# Patient Record
Sex: MALE | Race: WHITE | NOT HISPANIC OR LATINO | Employment: OTHER | URBAN - METROPOLITAN AREA
[De-identification: names, ages, dates, MRNs, and addresses within clinical notes are randomized per-mention and may not be internally consistent; named-entity substitution may affect disease eponyms.]

---

## 2021-06-03 RX ORDER — SIMVASTATIN 40 MG
40 TABLET ORAL NIGHTLY
COMMUNITY
End: 2022-02-17

## 2021-06-03 RX ORDER — ASPIRIN 81 MG/1
81 TABLET ORAL DAILY
COMMUNITY
End: 2021-06-07

## 2021-06-03 RX ORDER — METFORMIN HYDROCHLORIDE 500 MG/1
500 TABLET, EXTENDED RELEASE ORAL
COMMUNITY
End: 2021-06-07

## 2021-06-03 RX ORDER — MULTIPLE VITAMINS W/ MINERALS TAB 9MG-400MCG
1 TAB ORAL DAILY
COMMUNITY

## 2021-06-03 RX ORDER — ALFUZOSIN HYDROCHLORIDE 10 MG/1
10 TABLET, EXTENDED RELEASE ORAL DAILY
COMMUNITY

## 2021-06-03 RX ORDER — LEVOTHYROXINE SODIUM 0.1 MG/1
100 TABLET ORAL TAKE AS DIRECTED
COMMUNITY

## 2021-06-03 RX ORDER — RANITIDINE HCL 75 MG
75 TABLET ORAL 2 TIMES DAILY
COMMUNITY
End: 2021-06-07

## 2021-06-07 ENCOUNTER — OFFICE VISIT (OUTPATIENT)
Dept: CARDIOLOGY | Facility: CLINIC | Age: 71
End: 2021-06-07

## 2021-06-07 VITALS
OXYGEN SATURATION: 97 % | BODY MASS INDEX: 23.74 KG/M2 | SYSTOLIC BLOOD PRESSURE: 120 MMHG | WEIGHT: 185 LBS | HEART RATE: 106 BPM | HEIGHT: 74 IN | DIASTOLIC BLOOD PRESSURE: 78 MMHG | RESPIRATION RATE: 18 BRPM

## 2021-06-07 DIAGNOSIS — I49.3 PVC'S (PREMATURE VENTRICULAR CONTRACTIONS): ICD-10-CM

## 2021-06-07 DIAGNOSIS — R06.09 DYSPNEA ON EXERTION: Primary | ICD-10-CM

## 2021-06-07 DIAGNOSIS — I34.0 NONRHEUMATIC MITRAL VALVE REGURGITATION: ICD-10-CM

## 2021-06-07 DIAGNOSIS — I10 ESSENTIAL HYPERTENSION: ICD-10-CM

## 2021-06-07 DIAGNOSIS — I49.3 PVC (PREMATURE VENTRICULAR CONTRACTION): ICD-10-CM

## 2021-06-07 PROCEDURE — 93000 ELECTROCARDIOGRAM COMPLETE: CPT | Performed by: INTERNAL MEDICINE

## 2021-06-07 PROCEDURE — 99203 OFFICE O/P NEW LOW 30 MIN: CPT | Performed by: INTERNAL MEDICINE

## 2021-06-07 NOTE — PROGRESS NOTES
Subjective:     Encounter Date:06/07/2021      Patient ID: Kendell Valdez is a 71 y.o. male.    Chief Complaint:  Chief Complaint   Patient presents with   • Establish Care     PVC's       HPI:  Patient presents to re-establish care for his PVC's.  He was previously followed at  but hasn't been seen in over 3 years.  His past medical history is significant for HTN, HLD, DM and hypothyroidism.  He has had PVC's for years and had a holter monitor done in 2017 which showed a 1.7% burden.  A stress echo 12/17 showed normal EF, mild-moderate MR and no ischemia.  He was not treated with B blockers due to low burden.    Since he was last seen, he has done well without any symptoms of chest discomfort, dyspnea, dizziness or fatigue.  He does note an occasional skipped beat but they are less frequent than in previous years.  He has retired from [employer] and now walks 8-10 miles per day.  He has altered his diet and lost weight for his diabetes and his A1C is around 5.5.      The following portions of the patient's history were reviewed and updated as appropriate: allergies, current medications, past family history, past medical history, past social history, past surgical history and problem list.    Problem List:  Patient Active Problem List   Diagnosis   • PVC (premature ventricular contraction)   • Mitral valve regurgitation   • Hyperlipidemia   • Hypertension   • GERD (gastroesophageal reflux disease)   • Diabetes mellitus (CMS/HCC)       Past Medical History:  Past Medical History:   Diagnosis Date   • Diabetes mellitus (CMS/HCC)    • GERD (gastroesophageal reflux disease)    • History of stress test 12/12/2017    Treadmill Stress Echo was negative for ischemia, EF 75%, Mild-Moderate MR   • Hyperlipidemia    • Hypertension    • Mitral valve regurgitation    • PVC (premature ventricular contraction)        Past Surgical History:  Past Surgical History:   Procedure Laterality Date   • NO PAST SURGERIES    "      Social History:  Social History     Socioeconomic History   • Marital status:      Spouse name: Not on file   • Number of children: Not on file   • Years of education: Not on file   • Highest education level: Not on file   Tobacco Use   • Smoking status: Never Smoker   • Smokeless tobacco: Never Used   Substance and Sexual Activity   • Alcohol use: Yes     Alcohol/week: 7.0 standard drinks     Types: 7 Glasses of wine per week   • Drug use: Defer   • Sexual activity: Defer       Allergies:  Allergies   Allergen Reactions   • Sulfa Antibiotics Hives   • Cephalosporins Rash       Immunizations:  Immunization History   Administered Date(s) Administered   • COVID-19 (PFIZER) 01/22/2021, 02/12/2021       ROS:  Review of Systems   Constitutional: Negative for malaise/fatigue.   Cardiovascular: Positive for irregular heartbeat. Negative for chest pain, dyspnea on exertion, leg swelling, near-syncope, orthopnea, palpitations, paroxysmal nocturnal dyspnea and syncope.   Respiratory: Negative for shortness of breath.    All other systems reviewed and are negative.         Objective:         /78 (BP Location: Left arm, Patient Position: Sitting, Cuff Size: Adult)   Pulse 106   Resp 18   Ht 188 cm (74\")   Wt 83.9 kg (185 lb)   SpO2 97%   BMI 23.75 kg/m²     Constitutional:       General: Not in acute distress.     Appearance: Well-developed.   Eyes:      General: No scleral icterus.     Conjunctiva/sclera: Conjunctivae normal.      Pupils: Pupils are equal, round, and reactive to light.   HENT:      Head: Normocephalic and atraumatic.   Neck:      Thyroid: No thyromegaly.   Pulmonary:      Effort: Pulmonary effort is normal.      Breath sounds: Normal breath sounds.   Cardiovascular:      Normal rate. Regular rhythm.   Abdominal:      General: Bowel sounds are normal.      Palpations: Abdomen is soft.   Musculoskeletal: Normal range of motion.      Cervical back: Normal range of motion. Skin:     " General: Skin is warm and dry.   Neurological:      Mental Status: Alert and oriented to person, place, and time.         In-Office Procedure(s):    ECG 12 Lead    Date/Time: 6/7/2021 9:40 AM  Performed by: Jayy Rodríguez MD  Authorized by: Jayy Rodríguez MD   Previous ECG: no previous ECG available  Rhythm: sinus rhythm  QRS axis: left  Other findings: low voltage    Clinical impression: abnormal EKG            ASCVD RIsk Score::  The ASCVD Risk score (Martinez COLLEEN Jr., et al., 2013) failed to calculate for the following reasons:    Cannot find a previous HDL lab    Cannot find a previous total cholesterol lab    Recent Radiology:  Imaging Results (Most Recent)     None          Lab Review:   No visits with results within 2 Month(s) from this visit.   Latest known visit with results is:   No results found for any previous visit.                Assessment:          Diagnosis Plan   1. Dyspnea on exertion  Adult Stress Echo W/ Cont or Stress Agent if Necessary Per Protocol   2. PVC's (premature ventricular contractions)  ECG 12 Lead   3. PVC (premature ventricular contraction)     4. Nonrheumatic mitral valve regurgitation     5. Essential hypertension            Plan:      1. PVC's - essentially asymptomatic, no treatment at this time  2. Valvular heart disease - last echo showed mild-moderate MR, asymptomataic, will repeat echo    RTCX 1 year      Level of Care:                 Jayy Rodríguez MD  06/07/21  .

## 2021-07-09 ENCOUNTER — HOSPITAL ENCOUNTER (OUTPATIENT)
Dept: CARDIOLOGY | Facility: HOSPITAL | Age: 71
Discharge: HOME | End: 2021-07-09
Admitting: INTERNAL MEDICINE

## 2021-07-09 VITALS
BODY MASS INDEX: 23.77 KG/M2 | OXYGEN SATURATION: 99 % | WEIGHT: 185.19 LBS | SYSTOLIC BLOOD PRESSURE: 120 MMHG | DIASTOLIC BLOOD PRESSURE: 68 MMHG | HEART RATE: 103 BPM | HEIGHT: 74 IN

## 2021-07-09 DIAGNOSIS — R06.09 DYSPNEA ON EXERTION: ICD-10-CM

## 2021-07-09 LAB
AORTIC ARCH: 2.4 CM
AORTIC DIMENSIONLESS INDEX: 0.8 (DI)
ASCENDING AORTA: 3.2 CM
BH CV ECHO MEAS - ACS: 2 CM
BH CV ECHO MEAS - AO ACC TIME: 0.11 SEC
BH CV ECHO MEAS - AO MAX PG (FULL): 2.8 MMHG
BH CV ECHO MEAS - AO MAX PG: 6.9 MMHG
BH CV ECHO MEAS - AO MEAN PG (FULL): 2 MMHG
BH CV ECHO MEAS - AO MEAN PG: 4 MMHG
BH CV ECHO MEAS - AO ROOT AREA (BSA CORRECTED): 1.4
BH CV ECHO MEAS - AO ROOT AREA: 6.6 CM^2
BH CV ECHO MEAS - AO ROOT DIAM: 2.9 CM
BH CV ECHO MEAS - AO V2 MAX: 131 CM/SEC
BH CV ECHO MEAS - AO V2 MEAN: 90.3 CM/SEC
BH CV ECHO MEAS - AO V2 VTI: 28.3 CM
BH CV ECHO MEAS - ASC AORTA: 3.2 CM
BH CV ECHO MEAS - AVA(I,A): 2.7 CM^2
BH CV ECHO MEAS - AVA(I,D): 2.7 CM^2
BH CV ECHO MEAS - AVA(V,A): 2.7 CM^2
BH CV ECHO MEAS - AVA(V,D): 2.7 CM^2
BH CV ECHO MEAS - BSA(HAYCOCK): 2.1 M^2
BH CV ECHO MEAS - BSA: 2.1 M^2
BH CV ECHO MEAS - BZI_BMI: 23.8 KILOGRAMS/M^2
BH CV ECHO MEAS - BZI_METRIC_HEIGHT: 188 CM
BH CV ECHO MEAS - BZI_METRIC_WEIGHT: 84 KG
BH CV ECHO MEAS - CONTRAST EF (2CH): 57.1 CM2
BH CV ECHO MEAS - CONTRAST EF 4CH: 58.9 CM2
BH CV ECHO MEAS - EDV(CUBED): 50.7 ML
BH CV ECHO MEAS - EDV(MOD-SP2): 105 ML
BH CV ECHO MEAS - EDV(MOD-SP4): 141 ML
BH CV ECHO MEAS - EDV(TEICH): 58.1 ML
BH CV ECHO MEAS - EF(CUBED): 79 %
BH CV ECHO MEAS - EF(MOD-BP): 58.6 %
BH CV ECHO MEAS - EF(TEICH): 72.1 %
BH CV ECHO MEAS - ESV(CUBED): 10.6 ML
BH CV ECHO MEAS - ESV(MOD-SP2): 45 ML
BH CV ECHO MEAS - ESV(MOD-SP4): 58 ML
BH CV ECHO MEAS - ESV(TEICH): 16.2 ML
BH CV ECHO MEAS - FS: 40.5 %
BH CV ECHO MEAS - IVS/LVPW: 1.6
BH CV ECHO MEAS - IVSD: 1.4 CM
BH CV ECHO MEAS - LAT PEAK E' VEL: 15.2 CM/SEC
BH CV ECHO MEAS - LV DIASTOLIC VOL/BSA (35-75): 67 ML/M^2
BH CV ECHO MEAS - LV MASS(C)D: 138.2 GRAMS
BH CV ECHO MEAS - LV MASS(C)DI: 65.7 GRAMS/M^2
BH CV ECHO MEAS - LV MAX PG: 4.1 MMHG
BH CV ECHO MEAS - LV MEAN PG: 2 MMHG
BH CV ECHO MEAS - LV SYSTOLIC VOL/BSA (12-30): 27.6 ML/M^2
BH CV ECHO MEAS - LV V1 MAX: 101 CM/SEC
BH CV ECHO MEAS - LV V1 MEAN: 63.8 CM/SEC
BH CV ECHO MEAS - LV V1 VTI: 22.3 CM
BH CV ECHO MEAS - LVIDD: 3.7 CM
BH CV ECHO MEAS - LVIDS: 2.2 CM
BH CV ECHO MEAS - LVLD AP2: 8.2 CM
BH CV ECHO MEAS - LVLD AP4: 8.8 CM
BH CV ECHO MEAS - LVLS AP2: 6.8 CM
BH CV ECHO MEAS - LVLS AP4: 7 CM
BH CV ECHO MEAS - LVOT AREA (M): 3.5 CM^2
BH CV ECHO MEAS - LVOT AREA: 3.5 CM^2
BH CV ECHO MEAS - LVOT DIAM: 2.1 CM
BH CV ECHO MEAS - LVPWD: 0.9 CM
BH CV ECHO MEAS - MED PEAK E' VEL: 7.1 CM/SEC
BH CV ECHO MEAS - MV A DUR: 0.14 SEC
BH CV ECHO MEAS - MV A MAX VEL: 92.5 CM/SEC
BH CV ECHO MEAS - MV DEC SLOPE: 553 CM/SEC^2
BH CV ECHO MEAS - MV DEC TIME: 254 SEC
BH CV ECHO MEAS - MV E MAX VEL: 69.8 CM/SEC
BH CV ECHO MEAS - MV E/A: 0.75
BH CV ECHO MEAS - MV MAX PG: 3.3 MMHG
BH CV ECHO MEAS - MV MEAN PG: 2 MMHG
BH CV ECHO MEAS - MV P1/2T MAX VEL: 89.2 CM/SEC
BH CV ECHO MEAS - MV P1/2T: 47.2 MSEC
BH CV ECHO MEAS - MV V2 MAX: 90.5 CM/SEC
BH CV ECHO MEAS - MV V2 MEAN: 67.9 CM/SEC
BH CV ECHO MEAS - MV V2 VTI: 22.4 CM
BH CV ECHO MEAS - MVA P1/2T LCG: 2.5 CM^2
BH CV ECHO MEAS - MVA(P1/2T): 4.7 CM^2
BH CV ECHO MEAS - MVA(VTI): 3.4 CM^2
BH CV ECHO MEAS - PA ACC TIME: 0.09 SEC
BH CV ECHO MEAS - PA MAX PG (FULL): 3.2 MMHG
BH CV ECHO MEAS - PA MAX PG: 4.4 MMHG
BH CV ECHO MEAS - PA PR(ACCEL): 37.4 MMHG
BH CV ECHO MEAS - PA V2 MAX: 105 CM/SEC
BH CV ECHO MEAS - PULM A REVS DUR: 0.07 SEC
BH CV ECHO MEAS - PULM A REVS VEL: 17.6 CM/SEC
BH CV ECHO MEAS - PULM DIAS VEL: 37.7 CM/SEC
BH CV ECHO MEAS - PULM S/D: 1.4
BH CV ECHO MEAS - PULM SYS VEL: 51 CM/SEC
BH CV ECHO MEAS - PVA(V,A): 2 CM^2
BH CV ECHO MEAS - PVA(V,D): 2 CM^2
BH CV ECHO MEAS - QP/QS: 0.41
BH CV ECHO MEAS - RAP SYSTOLE: 3 MMHG
BH CV ECHO MEAS - RV MAX PG: 1.2 MMHG
BH CV ECHO MEAS - RV MEAN PG: 1 MMHG
BH CV ECHO MEAS - RV V1 MAX: 55.5 CM/SEC
BH CV ECHO MEAS - RV V1 MEAN: 34.8 CM/SEC
BH CV ECHO MEAS - RV V1 VTI: 8.3 CM
BH CV ECHO MEAS - RVOT AREA: 3.8 CM^2
BH CV ECHO MEAS - RVOT DIAM: 2.2 CM
BH CV ECHO MEAS - RVSP: 19.3 MMHG
BH CV ECHO MEAS - SI(AO): 88.9 ML/M^2
BH CV ECHO MEAS - SI(CUBED): 19 ML/M^2
BH CV ECHO MEAS - SI(LVOT): 36.7 ML/M^2
BH CV ECHO MEAS - SI(MOD-SP2): 28.5 ML/M^2
BH CV ECHO MEAS - SI(MOD-SP4): 39.5 ML/M^2
BH CV ECHO MEAS - SI(TEICH): 19.9 ML/M^2
BH CV ECHO MEAS - SUP REN AO DIAM: 2.2 CM
BH CV ECHO MEAS - SV(AO): 186.9 ML
BH CV ECHO MEAS - SV(CUBED): 40 ML
BH CV ECHO MEAS - SV(LVOT): 77.2 ML
BH CV ECHO MEAS - SV(MOD-SP2): 60 ML
BH CV ECHO MEAS - SV(MOD-SP4): 83 ML
BH CV ECHO MEAS - SV(RVOT): 31.5 ML
BH CV ECHO MEAS - SV(TEICH): 41.9 ML
BH CV ECHO MEAS - TAPSE (>1.6): 1.9 CM
BH CV ECHO MEAS - TR MAX VEL: 202 CM/SEC
BH CV ECHO MEASUREMENTS AVERAGE E/E' RATIO: 6.26
BH CV STRESS BP STAGE 1: NORMAL
BH CV STRESS BP STAGE 2: NORMAL
BH CV STRESS DURATION MIN STAGE 1: 3
BH CV STRESS DURATION MIN STAGE 2: 2
BH CV STRESS DURATION SEC STAGE 1: 0
BH CV STRESS DURATION SEC STAGE 2: 31
BH CV STRESS ECHO POST STRESS EJECTION FRACTION EF: 69 %
BH CV STRESS GRADE STAGE 1: 10
BH CV STRESS GRADE STAGE 2: 12
BH CV STRESS HR STAGE 1: 119
BH CV STRESS HR STAGE 2: 140
BH CV STRESS METS STAGE 1: 5
BH CV STRESS METS STAGE 2: 7.5
BH CV STRESS PROTOCOL 1: NORMAL
BH CV STRESS RECOVERY BP: NORMAL MMHG
BH CV STRESS RECOVERY HR: 85 BPM
BH CV STRESS SPEED STAGE 1: 1.7
BH CV STRESS SPEED STAGE 2: 2.5
BH CV STRESS STAGE 1: 1
BH CV STRESS STAGE 2: 2
BH CV VAS BP LEFT ARM: NORMAL MMHG
BH CV XLRA - RV BASE: 2.8 CM
BH CV XLRA - RV LENGTH: 8 CM
BH CV XLRA - RV MID: 2.6 CM
BH CV XLRA - TDI S': 10.8 CM/SEC
LEFT ATRIUM VOLUME INDEX: 17.9 ML/M2
MAXIMAL PREDICTED HEART RATE: 148 BPM
PERCENT MAX PREDICTED HR: 97.97 %
SINUS: 3 CM
STJ: 2.8 CM
STRESS BASELINE BP: NORMAL MMHG
STRESS BASELINE HR: 85 BPM
STRESS PERCENT HR: 115 %
STRESS POST ESTIMATED WORKLOAD: 7.1 METS
STRESS POST EXERCISE DUR MIN: 5 MIN
STRESS POST EXERCISE DUR SEC: 32 SEC
STRESS POST PEAK BP: NORMAL MMHG
STRESS POST PEAK HR: 145 BPM
STRESS TARGET HR: 126 BPM

## 2021-07-09 PROCEDURE — 93017 CV STRESS TEST TRACING ONLY: CPT

## 2021-07-09 PROCEDURE — 93350 STRESS TTE ONLY: CPT | Performed by: INTERNAL MEDICINE

## 2021-07-09 PROCEDURE — 93325 DOPPLER ECHO COLOR FLOW MAPG: CPT | Performed by: INTERNAL MEDICINE

## 2021-07-09 PROCEDURE — 25010000002 PERFLUTREN (DEFINITY) 8.476 MG IN SODIUM CHLORIDE (PF) 0.9 % 10 ML INJECTION: Performed by: INTERNAL MEDICINE

## 2021-07-09 PROCEDURE — 93325 DOPPLER ECHO COLOR FLOW MAPG: CPT

## 2021-07-09 PROCEDURE — 93320 DOPPLER ECHO COMPLETE: CPT | Performed by: INTERNAL MEDICINE

## 2021-07-09 PROCEDURE — 93018 CV STRESS TEST I&R ONLY: CPT | Performed by: INTERNAL MEDICINE

## 2021-07-09 PROCEDURE — 93350 STRESS TTE ONLY: CPT

## 2021-07-09 PROCEDURE — 93352 ADMIN ECG CONTRAST AGENT: CPT | Performed by: INTERNAL MEDICINE

## 2021-07-09 PROCEDURE — 93320 DOPPLER ECHO COMPLETE: CPT

## 2021-07-09 RX ADMIN — SODIUM CHLORIDE 6 ML: 9 INJECTION INTRAMUSCULAR; INTRAVENOUS; SUBCUTANEOUS at 12:25

## 2022-02-17 ENCOUNTER — OFFICE VISIT (OUTPATIENT)
Dept: CARDIOLOGY | Facility: CLINIC | Age: 72
End: 2022-02-17

## 2022-02-17 VITALS
BODY MASS INDEX: 24.26 KG/M2 | HEIGHT: 74 IN | SYSTOLIC BLOOD PRESSURE: 160 MMHG | HEART RATE: 109 BPM | DIASTOLIC BLOOD PRESSURE: 94 MMHG | WEIGHT: 189 LBS | RESPIRATION RATE: 18 BRPM

## 2022-02-17 DIAGNOSIS — I10 PRIMARY HYPERTENSION: ICD-10-CM

## 2022-02-17 DIAGNOSIS — I49.3 PVC (PREMATURE VENTRICULAR CONTRACTION): ICD-10-CM

## 2022-02-17 DIAGNOSIS — I20.8 OTHER FORMS OF ANGINA PECTORIS: Primary | ICD-10-CM

## 2022-02-17 DIAGNOSIS — I49.3 PVC'S (PREMATURE VENTRICULAR CONTRACTIONS): Primary | ICD-10-CM

## 2022-02-17 DIAGNOSIS — Z01.818 OTHER SPECIFIED PRE-OPERATIVE EXAMINATION: Primary | ICD-10-CM

## 2022-02-17 PROCEDURE — 93000 ELECTROCARDIOGRAM COMPLETE: CPT | Performed by: INTERNAL MEDICINE

## 2022-02-17 PROCEDURE — 99212 OFFICE O/P EST SF 10 MIN: CPT | Performed by: INTERNAL MEDICINE

## 2022-02-17 RX ORDER — ATORVASTATIN CALCIUM 40 MG/1
40 TABLET, FILM COATED ORAL DAILY
COMMUNITY

## 2022-02-17 RX ORDER — NITROFURANTOIN MACROCRYSTALS 100 MG/1
100 CAPSULE ORAL 2 TIMES DAILY
COMMUNITY
End: 2022-05-30

## 2022-02-17 NOTE — PROGRESS NOTES
Subjective:     Encounter Date:02/17/2022      Patient ID: Kendell Valdez is a 72 y.o. male.    Chief Complaint:  Chief Complaint   Patient presents with   • Chest Pain       HPI:  Patient presents with complaints of chest pain.  His past medical history is significant for HTN, HLD, DM and hypothyroidism.  He has had PVC's for years and had a holter monitor done in 2017 which showed a 1.7% burden.  A stress echo 12/17 showed normal EF, mild-moderate MR and no ischemia.  He was not treated with B blockers due to low burden.     He has retired from [employer] and now walks 8-10 miles per day.  He has altered his diet and lost weight for his diabetes and his A1C is around 5.5.    He had a stress echo 7/2021 which showed an EF of 55-60% with no significant valvular abnormalities and no ischemia.    Today, he reports that while shoveling some snow and walking in cold weather he developed some left chest discomfort which radiated into his left arm which resolved with rest.  He has no rest symptoms.         The following portions of the patient's history were reviewed and updated as appropriate: allergies, current medications, past family history, past medical history, past social history, past surgical history and problem list.    Problem List:  Patient Active Problem List   Diagnosis   • PVC (premature ventricular contraction)   • Mitral valve regurgitation   • Hyperlipidemia   • Hypertension   • GERD (gastroesophageal reflux disease)   • Diabetes mellitus (HCC)   • Other forms of angina pectoris (HCC)       Active Med List:    Current Outpatient Medications:   •  alfuzosin (UROXATRAL) 10 MG 24 hr tablet, Take 10 mg by mouth Daily., Disp: , Rfl:   •  atorvastatin (LIPITOR) 40 MG tablet, Take 40 mg by mouth Daily., Disp: , Rfl:   •  Calcium-Magnesium-Vitamin D (CITRACAL CALCIUM+D PO), Take 1 tablet by mouth., Disp: , Rfl:   •  levothyroxine (SYNTHROID, LEVOTHROID) 100 MCG tablet, Take 100 mcg by mouth Daily., Disp:  ", Rfl:   •  metFORMIN (GLUCOPHAGE) 500 MG tablet, Take 500 mg by mouth 3 (Three) Times a Day. 500mg AM, 250mg Noon, 500mg PM, Disp: , Rfl:   •  multivitamin with minerals tablet tablet, Take 1 tablet by mouth Daily., Disp: , Rfl:   •  nitrofurantoin (MACRODANTIN) 100 MG capsule, Take 100 mg by mouth 2 (Two) Times a Day., Disp: , Rfl:   •  Turmeric 1053 MG tablet, Take 1 capsule by mouth Daily., Disp: , Rfl:      Past Medical History:  Past Medical History:   Diagnosis Date   • Diabetes mellitus (HCC)    • GERD (gastroesophageal reflux disease)    • History of stress test 12/12/2017    Treadmill Stress Echo was negative for ischemia, EF 75%, Mild-Moderate MR   • Hyperlipidemia    • Hypertension    • Mitral valve regurgitation    • PVC (premature ventricular contraction)        Past Surgical History:  Past Surgical History:   Procedure Laterality Date   • NO PAST SURGERIES         Social History:  Social History     Socioeconomic History   • Marital status:    Tobacco Use   • Smoking status: Never Smoker   • Smokeless tobacco: Never Used   Substance and Sexual Activity   • Alcohol use: Yes     Alcohol/week: 7.0 standard drinks     Types: 7 Glasses of wine per week   • Drug use: Defer   • Sexual activity: Defer       Allergies:  Allergies   Allergen Reactions   • Sulfa Antibiotics Hives   • Cephalosporins Rash       Immunizations:  Immunization History   Administered Date(s) Administered   • COVID-19 (PFIZER) PURPLE CAP 01/22/2021, 02/12/2021, 09/09/2021          Objective:         Review of Systems   Constitutional: Negative for fatigue.   Respiratory: Negative for shortness of breath.    Cardiovascular: Positive for chest pain. Negative for palpitations and leg swelling.        /94   Pulse 109   Resp 18   Ht 188 cm (74\")   Wt 85.7 kg (189 lb)   BMI 24.27 kg/m²     Constitutional:       General: Not in acute distress.     Appearance: Well-developed.   Eyes:      General: No scleral icterus.     " Conjunctiva/sclera: Conjunctivae normal.      Pupils: Pupils are equal, round, and reactive to light.   HENT:      Head: Normocephalic and atraumatic.   Neck:      Thyroid: No thyromegaly.   Pulmonary:      Effort: Pulmonary effort is normal.      Breath sounds: Normal breath sounds.   Cardiovascular:      Normal rate. Regular rhythm.   Abdominal:      General: Bowel sounds are normal.      Palpations: Abdomen is soft.   Musculoskeletal: Normal range of motion.      Cervical back: Normal range of motion. Skin:     General: Skin is warm and dry.   Neurological:      Mental Status: Alert and oriented to person, place, and time.         In-Office Procedure(s):    ECG 12 Lead    Date/Time: 2/17/2022 4:22 PM  Performed by: Jayy Rodríguez MD  Authorized by: Jayy Rodríguez MD   Comparison: compared with previous ECG from 6/7/2021  Comparison to previous ECG: NSR, LAD, low voltage  Rhythm: sinus rhythm  QRS axis: left    Clinical impression: abnormal EKG          ECG 12 Lead    (Results Pending)        ASCVD RIsk Score::  The ASCVD Risk score (Candie DC Jr., et al., 2013) failed to calculate for the following reasons:    Cannot find a previous HDL lab    Cannot find a previous total cholesterol lab    Recent Radiology:          Lab Review:       Recent labs reviewed and interpreted for clinical significance and application          Assessment:          Diagnosis Plan   1. PVC's (premature ventricular contractions)  ECG 12 Lead   2. PVC (premature ventricular contraction)     3. Primary hypertension            Plan:         1. PVC's - essentially asymptomatic, no treatment at this time    2. Chest pain - normal stress echo last year but symptoms worrisome for angina.  Will schedule for cath.    RTC post cath         Level of Care:                 Jayy Rodríguez MD  02/17/22

## 2022-02-18 DIAGNOSIS — I49.3 PVC (PREMATURE VENTRICULAR CONTRACTION): ICD-10-CM

## 2022-02-18 DIAGNOSIS — Z01.818 PREOP TESTING: Primary | ICD-10-CM

## 2022-02-18 PROBLEM — I20.8 OTHER FORMS OF ANGINA PECTORIS (HCC): Status: ACTIVE | Noted: 2022-02-18

## 2022-02-22 ENCOUNTER — LAB (OUTPATIENT)
Dept: LAB | Facility: HOSPITAL | Age: 72
End: 2022-02-22

## 2022-02-22 DIAGNOSIS — Z20.822 ENCOUNTER FOR PREOPERATIVE SCREENING LABORATORY TESTING FOR COVID-19 VIRUS: Primary | ICD-10-CM

## 2022-02-22 DIAGNOSIS — Z01.812 ENCOUNTER FOR PREOPERATIVE SCREENING LABORATORY TESTING FOR COVID-19 VIRUS: Primary | ICD-10-CM

## 2022-02-22 DIAGNOSIS — I49.3 PVC (PREMATURE VENTRICULAR CONTRACTION): ICD-10-CM

## 2022-02-22 DIAGNOSIS — Z01.818 PREOP TESTING: ICD-10-CM

## 2022-02-22 LAB
ALBUMIN SERPL-MCNC: 4.4 G/DL (ref 3.5–5.2)
ALBUMIN/GLOB SERPL: 1.8 G/DL
ALP SERPL-CCNC: 161 U/L (ref 39–117)
ALT SERPL W P-5'-P-CCNC: 59 U/L (ref 1–41)
ANION GAP SERPL CALCULATED.3IONS-SCNC: 9 MMOL/L (ref 5–15)
AST SERPL-CCNC: 41 U/L (ref 1–40)
BASOPHILS # BLD AUTO: 0.05 10*3/MM3 (ref 0–0.2)
BASOPHILS NFR BLD AUTO: 0.9 % (ref 0–1.5)
BILIRUB SERPL-MCNC: 0.3 MG/DL (ref 0–1.2)
BUN SERPL-MCNC: 22 MG/DL (ref 8–23)
BUN/CREAT SERPL: 22.9 (ref 7–25)
CALCIUM SPEC-SCNC: 9.3 MG/DL (ref 8.6–10.5)
CHLORIDE SERPL-SCNC: 104 MMOL/L (ref 98–107)
CO2 SERPL-SCNC: 30 MMOL/L (ref 22–29)
CREAT SERPL-MCNC: 0.96 MG/DL (ref 0.76–1.27)
DEPRECATED RDW RBC AUTO: 44 FL (ref 37–54)
EOSINOPHIL # BLD AUTO: 0.06 10*3/MM3 (ref 0–0.4)
EOSINOPHIL NFR BLD AUTO: 1.1 % (ref 0.3–6.2)
ERYTHROCYTE [DISTWIDTH] IN BLOOD BY AUTOMATED COUNT: 12.4 % (ref 12.3–15.4)
GFR SERPL CREATININE-BSD FRML MDRD: 77 ML/MIN/1.73
GLOBULIN UR ELPH-MCNC: 2.4 GM/DL
GLUCOSE SERPL-MCNC: 73 MG/DL (ref 65–99)
HCT VFR BLD AUTO: 43.5 % (ref 37.5–51)
HGB BLD-MCNC: 14.2 G/DL (ref 13–17.7)
IMM GRANULOCYTES # BLD AUTO: 0.02 10*3/MM3 (ref 0–0.05)
IMM GRANULOCYTES NFR BLD AUTO: 0.4 % (ref 0–0.5)
INR PPP: 0.98 (ref 0.9–1.1)
LYMPHOCYTES # BLD AUTO: 1.44 10*3/MM3 (ref 0.7–3.1)
LYMPHOCYTES NFR BLD AUTO: 27.1 % (ref 19.6–45.3)
MCH RBC QN AUTO: 31.6 PG (ref 26.6–33)
MCHC RBC AUTO-ENTMCNC: 32.6 G/DL (ref 31.5–35.7)
MCV RBC AUTO: 96.7 FL (ref 79–97)
MONOCYTES # BLD AUTO: 0.44 10*3/MM3 (ref 0.1–0.9)
MONOCYTES NFR BLD AUTO: 8.3 % (ref 5–12)
NEUTROPHILS NFR BLD AUTO: 3.3 10*3/MM3 (ref 1.7–7)
NEUTROPHILS NFR BLD AUTO: 62.2 % (ref 42.7–76)
NRBC BLD AUTO-RTO: 0 /100 WBC (ref 0–0.2)
PLATELET # BLD AUTO: 177 10*3/MM3 (ref 140–450)
PMV BLD AUTO: 9.4 FL (ref 6–12)
POTASSIUM SERPL-SCNC: 4.6 MMOL/L (ref 3.5–5.2)
PROT SERPL-MCNC: 6.8 G/DL (ref 6–8.5)
PROTHROMBIN TIME: 12.9 SECONDS (ref 11.7–14.2)
RBC # BLD AUTO: 4.5 10*6/MM3 (ref 4.14–5.8)
SARS-COV-2 ORF1AB RESP QL NAA+PROBE: NOT DETECTED
SODIUM SERPL-SCNC: 143 MMOL/L (ref 136–145)
WBC NRBC COR # BLD: 5.31 10*3/MM3 (ref 3.4–10.8)

## 2022-02-22 PROCEDURE — 80053 COMPREHEN METABOLIC PANEL: CPT

## 2022-02-22 PROCEDURE — 36415 COLL VENOUS BLD VENIPUNCTURE: CPT

## 2022-02-22 PROCEDURE — U0004 COV-19 TEST NON-CDC HGH THRU: HCPCS

## 2022-02-22 PROCEDURE — 85610 PROTHROMBIN TIME: CPT

## 2022-02-22 PROCEDURE — C9803 HOPD COVID-19 SPEC COLLECT: HCPCS

## 2022-02-22 PROCEDURE — 85025 COMPLETE CBC W/AUTO DIFF WBC: CPT

## 2022-02-24 ENCOUNTER — HOSPITAL ENCOUNTER (OUTPATIENT)
Facility: HOSPITAL | Age: 72
Setting detail: HOSPITAL OUTPATIENT SURGERY
Discharge: HOME | End: 2022-02-24
Attending: INTERNAL MEDICINE | Admitting: INTERNAL MEDICINE

## 2022-02-24 VITALS
HEART RATE: 61 BPM | TEMPERATURE: 96.8 F | DIASTOLIC BLOOD PRESSURE: 58 MMHG | SYSTOLIC BLOOD PRESSURE: 120 MMHG | WEIGHT: 182 LBS | BODY MASS INDEX: 23.36 KG/M2 | HEIGHT: 74 IN | OXYGEN SATURATION: 97 % | RESPIRATION RATE: 18 BRPM

## 2022-02-24 DIAGNOSIS — I49.3 PVC (PREMATURE VENTRICULAR CONTRACTION): ICD-10-CM

## 2022-02-24 DIAGNOSIS — I20.8 OTHER FORMS OF ANGINA PECTORIS: ICD-10-CM

## 2022-02-24 LAB — GLUCOSE BLDC GLUCOMTR-MCNC: 107 MG/DL (ref 70–130)

## 2022-02-24 PROCEDURE — 25010000002 FENTANYL CITRATE (PF) 50 MCG/ML SOLUTION: Performed by: INTERNAL MEDICINE

## 2022-02-24 PROCEDURE — C1769 GUIDE WIRE: HCPCS | Performed by: INTERNAL MEDICINE

## 2022-02-24 PROCEDURE — 93458 L HRT ARTERY/VENTRICLE ANGIO: CPT | Performed by: INTERNAL MEDICINE

## 2022-02-24 PROCEDURE — 0 IOPAMIDOL PER 1 ML: Performed by: INTERNAL MEDICINE

## 2022-02-24 PROCEDURE — 25010000002 MIDAZOLAM PER 1 MG: Performed by: INTERNAL MEDICINE

## 2022-02-24 PROCEDURE — 82962 GLUCOSE BLOOD TEST: CPT

## 2022-02-24 PROCEDURE — 25010000002 HEPARIN (PORCINE) PER 1000 UNITS: Performed by: INTERNAL MEDICINE

## 2022-02-24 PROCEDURE — C1894 INTRO/SHEATH, NON-LASER: HCPCS | Performed by: INTERNAL MEDICINE

## 2022-02-24 RX ORDER — SODIUM CHLORIDE 9 MG/ML
100 INJECTION, SOLUTION INTRAVENOUS CONTINUOUS
Status: DISCONTINUED | OUTPATIENT
Start: 2022-02-24 | End: 2022-02-24 | Stop reason: HOSPADM

## 2022-02-24 RX ORDER — LIDOCAINE HYDROCHLORIDE 10 MG/ML
0.1 INJECTION, SOLUTION EPIDURAL; INFILTRATION; INTRACAUDAL; PERINEURAL ONCE AS NEEDED
Status: DISCONTINUED | OUTPATIENT
Start: 2022-02-24 | End: 2022-02-24 | Stop reason: HOSPADM

## 2022-02-24 RX ORDER — FENTANYL CITRATE 50 UG/ML
INJECTION, SOLUTION INTRAMUSCULAR; INTRAVENOUS AS NEEDED
Status: DISCONTINUED | OUTPATIENT
Start: 2022-02-24 | End: 2022-02-24 | Stop reason: HOSPADM

## 2022-02-24 RX ORDER — SODIUM CHLORIDE 9 MG/ML
75 INJECTION, SOLUTION INTRAVENOUS CONTINUOUS
Status: DISCONTINUED | OUTPATIENT
Start: 2022-02-24 | End: 2022-02-24 | Stop reason: HOSPADM

## 2022-02-24 RX ORDER — MIDAZOLAM HYDROCHLORIDE 1 MG/ML
INJECTION INTRAMUSCULAR; INTRAVENOUS AS NEEDED
Status: DISCONTINUED | OUTPATIENT
Start: 2022-02-24 | End: 2022-02-24 | Stop reason: HOSPADM

## 2022-02-24 RX ORDER — LIDOCAINE HYDROCHLORIDE 20 MG/ML
INJECTION, SOLUTION INFILTRATION; PERINEURAL AS NEEDED
Status: DISCONTINUED | OUTPATIENT
Start: 2022-02-24 | End: 2022-02-24 | Stop reason: HOSPADM

## 2022-02-24 RX ORDER — ACETAMINOPHEN 325 MG/1
650 TABLET ORAL EVERY 4 HOURS PRN
Status: DISCONTINUED | OUTPATIENT
Start: 2022-02-24 | End: 2022-02-24 | Stop reason: HOSPADM

## 2022-02-24 RX ORDER — SODIUM CHLORIDE 0.9 % (FLUSH) 0.9 %
10 SYRINGE (ML) INJECTION AS NEEDED
Status: DISCONTINUED | OUTPATIENT
Start: 2022-02-24 | End: 2022-02-24 | Stop reason: HOSPADM

## 2022-02-24 RX ORDER — SODIUM CHLORIDE 0.9 % (FLUSH) 0.9 %
3 SYRINGE (ML) INJECTION EVERY 12 HOURS SCHEDULED
Status: DISCONTINUED | OUTPATIENT
Start: 2022-02-24 | End: 2022-02-24 | Stop reason: HOSPADM

## 2022-02-24 RX ADMIN — SODIUM CHLORIDE 75 ML/HR: 9 INJECTION, SOLUTION INTRAVENOUS at 08:39

## 2022-02-25 ENCOUNTER — TELEPHONE (OUTPATIENT)
Dept: CARDIOLOGY | Facility: CLINIC | Age: 72
End: 2022-02-25

## 2022-02-25 DIAGNOSIS — G47.33 OSA (OBSTRUCTIVE SLEEP APNEA): Primary | ICD-10-CM

## 2022-02-25 NOTE — TELEPHONE ENCOUNTER
Patient would like to know the results of his heart cath done on 02/24/2022 with [facility]. Patient would also like to know if he still needs the appointment with [facility] in June. Patient is also wondering about the home sleep study and who will be referring him. Patient states that D mentioned a referral being placed but has not heard an update on it.     Please adviseTrenton

## 2022-02-28 NOTE — TELEPHONE ENCOUNTER
I am certain Dr. Rodríguez informed him of the results of his cath.  He has nonobstructive disease.  I sent a referral to Dr. Amador for a sleep evaluation.

## 2022-02-28 NOTE — TELEPHONE ENCOUNTER
Left voicemail at [phone] notifying pt of cath results, the sleep referral that has been sent, and his appointment with [facility] in June.     Left office phone number and directed the patient to call with any further questions or concerns.

## 2022-05-19 ENCOUNTER — OFFICE VISIT (OUTPATIENT)
Dept: SLEEP MEDICINE | Facility: HOSPITAL | Age: 72
End: 2022-05-19

## 2022-05-19 VITALS — WEIGHT: 191.6 LBS | OXYGEN SATURATION: 98 % | HEIGHT: 74 IN | HEART RATE: 104 BPM | BODY MASS INDEX: 24.59 KG/M2

## 2022-05-19 DIAGNOSIS — G47.36 HYPOXEMIA ASSOCIATED WITH SLEEP: Primary | ICD-10-CM

## 2022-05-19 DIAGNOSIS — G47.10 HYPERSOMNIA, UNSPECIFIED: ICD-10-CM

## 2022-05-19 PROCEDURE — 99204 OFFICE O/P NEW MOD 45 MIN: CPT | Performed by: INTERNAL MEDICINE

## 2022-05-19 PROCEDURE — G0463 HOSPITAL OUTPT CLINIC VISIT: HCPCS

## 2022-05-19 NOTE — PROGRESS NOTES
[facility] New [facility]Consultation       Reason for Consultation: RUTH    Patient Care Team:  Nri Camp MD as PCP - General (Internal Medicine)  Deam, Jayy Thomas MD as Consulting Physician (Cardiac Electrophysiology)  Benjamin Amador MD as Consulting Physician (Sleep Medicine)    Chief complaint: Low blood oxygen levels via a smart watch    History of present illness:    Thank you for asking me to see your patient.  The patient is a 72 y.o. male who reports low blood oxygen levels below 90% as discovered by having a smart watch.  Subsequently, overnight oximetry performed 1/5/2022 with test duration of 562 minutes.  Low oxygen saturation 84% and time less than equal to 88% was only 3 minutes and 36 seconds.  Sleep evaluation requested.    The patient goes to bed between 11:30 PM and 12:30 AM.  He awakens between 8 and 8:30 AM.  He states he feels good upon arising.  Most days, he does not take a nap.  However, he does have complaints of hypersomnolence and his [place] Sleepiness Scale is borderline abnormal at 8.  He reports losing 50 pounds in the last several years.  He does not snore.  He grinds his teeth and he uses a nightguard.  He does awaken with a sour taste in his mouth at times.  He does perform self catheterizations.  He will use the restroom 2-3 times    Review of Systems:    A complete review of systems was done and all were negative with the exception of some nasal congestion and postnasal drip, and a history of heart arrhythmias     History:  Past Medical History:   Diagnosis Date   • Diabetes mellitus (HCC)    • Disease of thyroid gland    • GERD (gastroesophageal reflux disease)    • History of stress test 12/12/2017    Treadmill Stress Echo was negative for ischemia, EF 75%, Mild-Moderate MR   • Hyperlipidemia    • Hypertension    • Mitral valve regurgitation    • PVC (premature ventricular contraction)    ,   Past Surgical History:   Procedure Laterality Date   •  "CARDIAC CATHETERIZATION N/A 2/24/2022    Procedure: Left Heart Cath;  Surgeon: Winston Rodríguez MD;  Location:  [facility];  Service: Cardiology;  Laterality: N/A;   • CARDIAC CATHETERIZATION N/A 2/24/2022    Procedure: Coronary angiography;  Surgeon: Winston Rodríguez MD;  Location:  [facility];  Service: Cardiology;  Laterality: N/A;   • CARDIAC CATHETERIZATION N/A 2/24/2022    Procedure: Left ventriculography;  Surgeon: Winston Rodríguez MD;  Location:  [facility];  Service: Cardiology;  Laterality: N/A;   • INGUINAL HERNIA REPAIR Left    • TONSILLECTOMY     ,   Family History   Problem Relation Age of Onset   • Colon cancer Mother    • Coronary artery disease Father    • Stroke Father    • Diabetes Father     and   Social History     Socioeconomic History   • Marital status:    Tobacco Use   • Smoking status: Never Smoker   • Smokeless tobacco: Never Used   Substance and Sexual Activity   • Alcohol use: Yes     Alcohol/week: 7.0 standard drinks     Types: 7 Glasses of wine per week     Comment: WITH DINNER   • Drug use: Defer   • Sexual activity: Defer     E-cigarette/Vaping     E-cigarette/Vaping Substances     E-cigarette/Vaping Devices        Social History: He is a retired .  6 caffeinated drinks per day, coffee or tea    Allergies:  Sulfa antibiotics and Cephalosporins     Medication Review: Reviewed    Vital Signs:    Vitals:    05/19/22 0700   Pulse: 104   SpO2: 98%   Weight: 86.9 kg (191 lb 9.6 oz)   Height: 188 cm (74\")      Body mass index is 24.6 kg/m².  Neck Circumference: 15.5 inches      Physical Exam:    Constitutional:  Well developed 72 y.o. male that appears in no apparent distress.  Awake & oriented times 3.  Normal mood with normal recent and remote memory and normal judgement.  Eyes:  Conjunctivae normal.  Oropharynx: Moist mucous membranes without exudate and a borderline enlarged tongue, normal " uvula, and class II Mallampati airway.  Patient wearing a facemask.  Neck: Trachea midline  Respiratory: Effort is not labored  Cardiovascular: Radial pulse regular  Musculoskeletal: Gait appears normal, no digital clubbing evident, no pre-tibial edema    Results Review: I reviewed his 2-week sleep log and also reviewed information that he printed out from his smart watch.    Impression:   Hypersomnolence with an evidence of oxygen saturation variability without definite evidence of sleep-related hypoxia by overnight oximetry performed January 2022.  Rule out obstructive sleep apnea.  Comorbidities include primary hypertension with PVCs    Plan:  Good sleep hygiene measures should be maintained.      Pathophysiology of RUTH described to the patient.  I also described oxygen variability in the setting of obstructive sleep apnea.  Cardiovascular complications of untreated RUTH also reviewed.  I also explained how untreated RUTH may be a strong contributor to hypertension.    After reviewing all with the patient, I would recommend and he is agreeable to proceed with a home sleep study.  I answered all of his questions.  Home sleep study will be scheduled and follow-up will be determined by home sleep study results.    Thank you for requesting me to assist in this patient's care.    Benjamin Amador MD  Sleep Medicine  05/19/22  08:46 EDT

## 2022-05-30 PROBLEM — G47.36 HYPOXEMIA ASSOCIATED WITH SLEEP: Status: ACTIVE | Noted: 2022-05-30

## 2022-05-30 PROBLEM — G47.10 HYPERSOMNIA, UNSPECIFIED: Status: ACTIVE | Noted: 2022-05-30

## 2022-06-09 ENCOUNTER — OFFICE VISIT (OUTPATIENT)
Dept: CARDIOLOGY | Facility: CLINIC | Age: 72
End: 2022-06-09

## 2022-06-09 VITALS
BODY MASS INDEX: 24 KG/M2 | OXYGEN SATURATION: 96 % | WEIGHT: 187 LBS | SYSTOLIC BLOOD PRESSURE: 130 MMHG | DIASTOLIC BLOOD PRESSURE: 80 MMHG | HEART RATE: 88 BPM | HEIGHT: 74 IN

## 2022-06-09 DIAGNOSIS — I49.3 PVC (PREMATURE VENTRICULAR CONTRACTION): Primary | ICD-10-CM

## 2022-06-09 DIAGNOSIS — I10 PRIMARY HYPERTENSION: ICD-10-CM

## 2022-06-09 PROCEDURE — 99212 OFFICE O/P EST SF 10 MIN: CPT | Performed by: INTERNAL MEDICINE

## 2022-06-09 PROCEDURE — 93000 ELECTROCARDIOGRAM COMPLETE: CPT | Performed by: INTERNAL MEDICINE

## 2022-06-09 RX ORDER — FLUTICASONE PROPIONATE 50 MCG
1 SPRAY, SUSPENSION (ML) NASAL DAILY
COMMUNITY

## 2022-06-09 RX ORDER — PSEUDOEPHEDRINE HYDROCHLORIDE 60 MG/1
60 TABLET, FILM COATED ORAL AS NEEDED
COMMUNITY

## 2022-06-09 NOTE — PROGRESS NOTES
Subjective:     Encounter Date:06/09/2022      Patient ID: Kendell Valdez is a 72 y.o. male.    Chief Complaint:  Chief Complaint   Patient presents with   • Follow-up   • Irregular Heart Beat   • Chest Pain       HPI:  Patient presents for routine followup.  His past medical history is significant for HTN, HLD, DM and hypothyroidism.  He has had PVC's for years and had a holter monitor done in 2017 which showed a 1.7% burden.  A stress echo 12/17 showed normal EF, mild-moderate MR and no ischemia.  He was not treated with B blockers due to low burden.     He has retired from [employer] and now walks 8-10 miles per day.  He has altered his diet and lost weight for his diabetes and his A1C is around 5.5.     He had a stress echo 7/2021 which showed an EF of 55-60% with no significant valvular abnormalities and no ischemia.     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    Since he was last seen, he has been doing well without symptoms of palpitations, chest pain, dyspnea or lightheadedness.  He continues to be very active working out and walking daily without limitations.         The following portions of the patient's history were reviewed and updated as appropriate: allergies, current medications, past family history, past medical history, past social history, past surgical history and problem list.    Problem List:  Patient Active Problem List   Diagnosis   • PVC (premature ventricular contraction)   • Mitral valve regurgitation   • Hyperlipidemia   • Hypertension   • GERD (gastroesophageal reflux disease)   • Diabetes mellitus (HCC)   • Other forms of angina pectoris (HCC)   • Hypersomnia, unspecified   • Hypoxemia associated with sleep       Active Med List:    Current Outpatient Medications:   •  alfuzosin (UROXATRAL) 10 MG 24 hr tablet, Take 10 mg by mouth Daily., Disp: , Rfl:   •  atorvastatin (LIPITOR) 40 MG tablet, Take 40 mg by mouth Daily., Disp: , Rfl:    •  fluticasone (FLONASE) 50 MCG/ACT nasal spray, 1 spray into the nostril(s) as directed by provider Daily., Disp: , Rfl:   •  levothyroxine (SYNTHROID, LEVOTHROID) 100 MCG tablet, Take 100 mcg by mouth Take As Directed. 6 DAYS/WEEK.  NONE ON WEDS, Disp: , Rfl:   •  metFORMIN (GLUCOPHAGE) 500 MG tablet, Take 500 mg by mouth 3 (Three) Times a Day. 500mg AM, 250mg Noon, 500mg PM, Disp: , Rfl:   •  methylcellulose, Laxative, (CITRUCEL) 500 MG tablet tablet, Take 2 tablets by mouth Every 4 (Four) Hours As Needed., Disp: , Rfl:   •  multivitamin with minerals tablet tablet, Take 1 tablet by mouth Daily., Disp: , Rfl:   •  pseudoephedrine (SUDAFED) 60 MG tablet, Take 60 mg by mouth As Needed for Congestion., Disp: , Rfl:   •  Turmeric 1053 MG tablet, Take 1 capsule by mouth Daily., Disp: , Rfl:      Past Medical History:  Past Medical History:   Diagnosis Date   • Abnormal ECG    • Arrhythmia 2015    Corrected   • Diabetes mellitus (HCC)    • Disease of thyroid gland    • GERD (gastroesophageal reflux disease)    • History of stress test 12/12/2017    Treadmill Stress Echo was negative for ischemia, EF 75%, Mild-Moderate MR   • Hyperlipidemia    • Hypertension    • Mitral valve regurgitation    • PVC (premature ventricular contraction)        Past Surgical History:  Past Surgical History:   Procedure Laterality Date   • CARDIAC CATHETERIZATION N/A 02/24/2022    Procedure: Left Heart Cath;  Surgeon: Winston Rodríguez MD;  Location: [facility] INVASIVE LOCATION;  Service: Cardiology;  Laterality: N/A;   • CARDIAC CATHETERIZATION N/A 02/24/2022    Procedure: Coronary angiography;  Surgeon: Winston Rodríguez MD;  Location: [facility] CATH INVASIVE LOCATION;  Service: Cardiology;  Laterality: N/A;   • CARDIAC CATHETERIZATION N/A 02/24/2022    Procedure: Left ventriculography;  Surgeon: Winston Rodríguez MD;  Location: [facility] CATH INVASIVE LOCATION;  Service: Cardiology;  Laterality: N/A;   • INGUINAL  "HERNIA REPAIR Left    • TONSILLECTOMY         Social History:  Social History     Socioeconomic History   • Marital status:    Tobacco Use   • Smoking status: Never Smoker   • Smokeless tobacco: Never Used   Substance and Sexual Activity   • Alcohol use: Not Currently     Alcohol/week: 7.0 standard drinks     Types: 7 Glasses of wine per week     Comment: WITH DINNER   • Drug use: Never   • Sexual activity: Yes     Partners: Female       Allergies:  Allergies   Allergen Reactions   • Sulfa Antibiotics Hives   • Cephalosporins Rash       Immunizations:  Immunization History   Administered Date(s) Administered   • COVID-19 (PFIZER) PURPLE CAP 01/22/2021, 02/12/2021, 09/09/2021          Objective:         Review of Systems   Constitutional: Negative for fatigue.   Respiratory: Negative for shortness of breath.    Cardiovascular: Negative for chest pain, palpitations and leg swelling.   All other systems reviewed and are negative.       /80   Pulse 88   Ht 188 cm (74\")   Wt 84.8 kg (187 lb)   SpO2 96%   BMI 24.01 kg/m²     Constitutional:       General: Not in acute distress.     Appearance: Well-developed.   Eyes:      General: No scleral icterus.     Conjunctiva/sclera: Conjunctivae normal.      Pupils: Pupils are equal, round, and reactive to light.   HENT:      Head: Normocephalic and atraumatic.   Neck:      Thyroid: No thyromegaly.   Pulmonary:      Effort: Pulmonary effort is normal.      Breath sounds: Normal breath sounds.   Cardiovascular:      Normal rate. Regular rhythm.   Abdominal:      General: Bowel sounds are normal.      Palpations: Abdomen is soft.   Musculoskeletal: Normal range of motion.      Cervical back: Normal range of motion. Skin:     General: Skin is warm and dry.   Neurological:      Mental Status: Alert and oriented to person, place, and time.         In-Office Procedure(s):    ECG 12 Lead    Date/Time: 6/9/2022 12:09 PM  Performed by: Jayy Rodríguez MD  Authorized " by: Jayy Rodríguez MD   Comparison: compared with previous ECG from 2/17/2022  Comparison to previous ECG: NSR, LAD  Rhythm: sinus rhythm  Rate: normal  Conduction: right bundle branch block    Clinical impression: abnormal EKG          ECG 12 Lead   Final Result              ASCVD RIsk Score::  The ASCVD Risk score (Candie MACIAS Jr., et al., 2013) failed to calculate for the following reasons:    Cannot find a previous HDL lab    Cannot find a previous total cholesterol lab    Recent Radiology:          Lab Review:       Recent labs reviewed and interpreted for clinical significance and application          Assessment:          Diagnosis Plan   1. PVC (premature ventricular contraction)     2. Primary hypertension            Plan:      1. PVC's - essentially asymptomatic, normal EF and no significant CAD, no treatment at this time     2. HTN - controlled    3. DM - on metformin per PCP    4. HLD - on statin per PCP     RTC 1 year       Level of Care:                 Jayy Rodríguez MD  06/09/22

## 2022-07-01 ENCOUNTER — APPOINTMENT (OUTPATIENT)
Dept: SLEEP MEDICINE | Facility: HOSPITAL | Age: 72
End: 2022-07-01

## (undated) DEVICE — CATH DIAG EXPO .045 FL3  5F 100CM

## (undated) DEVICE — GW EMR FIX EXCHG J STD .035 3MM 260CM

## (undated) DEVICE — TR BAND RADIAL ARTERY COMPRESSION DEVICE: Brand: TR BAND

## (undated) DEVICE — CATH DIAG IMPULSE FR4 6F 100CM

## (undated) DEVICE — PK CATH CARD 40

## (undated) DEVICE — KT MANIFLD CARDIAC

## (undated) DEVICE — GLIDESHEATH SLENDER STAINLESS STEEL KIT: Brand: GLIDESHEATH SLENDER